# Patient Record
Sex: MALE | Race: OTHER | HISPANIC OR LATINO | URBAN - METROPOLITAN AREA
[De-identification: names, ages, dates, MRNs, and addresses within clinical notes are randomized per-mention and may not be internally consistent; named-entity substitution may affect disease eponyms.]

---

## 2023-01-18 ENCOUNTER — INPATIENT (INPATIENT)
Facility: HOSPITAL | Age: 38
LOS: 1 days | Discharge: HOME | DRG: 552 | End: 2023-01-20
Attending: NEUROLOGICAL SURGERY | Admitting: NEUROLOGICAL SURGERY
Payer: MEDICAID

## 2023-01-18 VITALS
DIASTOLIC BLOOD PRESSURE: 80 MMHG | TEMPERATURE: 98 F | OXYGEN SATURATION: 96 % | HEIGHT: 74 IN | RESPIRATION RATE: 18 BRPM | HEART RATE: 80 BPM | SYSTOLIC BLOOD PRESSURE: 137 MMHG | WEIGHT: 179.9 LBS

## 2023-01-18 PROCEDURE — 99285 EMERGENCY DEPT VISIT HI MDM: CPT

## 2023-01-18 RX ORDER — KETOROLAC TROMETHAMINE 30 MG/ML
30 SYRINGE (ML) INJECTION ONCE
Refills: 0 | Status: DISCONTINUED | OUTPATIENT
Start: 2023-01-18 | End: 2023-01-18

## 2023-01-18 RX ORDER — OXYCODONE AND ACETAMINOPHEN 5; 325 MG/1; MG/1
1 TABLET ORAL ONCE
Refills: 0 | Status: DISCONTINUED | OUTPATIENT
Start: 2023-01-18 | End: 2023-01-18

## 2023-01-18 RX ORDER — DIAZEPAM 5 MG
2 TABLET ORAL ONCE
Refills: 0 | Status: DISCONTINUED | OUTPATIENT
Start: 2023-01-18 | End: 2023-01-18

## 2023-01-18 RX ADMIN — Medication 2 MILLIGRAM(S): at 23:13

## 2023-01-18 RX ADMIN — OXYCODONE AND ACETAMINOPHEN 1 TABLET(S): 5; 325 TABLET ORAL at 23:13

## 2023-01-18 RX ADMIN — Medication 30 MILLIGRAM(S): at 23:13

## 2023-01-18 NOTE — ED PROVIDER NOTE - OBJECTIVE STATEMENT
38 yo M [facility] p/w L lower back pain radiating down L leg after lifting heavy things at work yesterday. No fevers/chills, saddle anes, urinary or bowel incontinence, focal numbness/weakness, No h/o IVDU/Cancer, No Trauma/Hx Spinal Surgeries.

## 2023-01-18 NOTE — ED PROVIDER NOTE - PROGRESS NOTE DETAILS
ATTG: : patient endorsed to me by Dr. Uribe. Awaiting Lumbar MRI without contrast. began with pain 2 days ago on lower back / buttocks pain that extends beyond the knee. after pain controlled found to have weakness on left leg.CT with indentation of thecal sac without obvious cord compression, Dr. Cruz consulted and rec imaging and will eval patient. Patient with pain controlled. discussed with ortho, will admit for monitor, possible surgery if symptoms not improved

## 2023-01-18 NOTE — ED PROVIDER NOTE - PHYSICAL EXAMINATION
Vital Signs Reviewed  GEN: Comfortable, NAD, AAOx3  HEENT: NCAT, MMM, Neck Supple  RESP: CTAB, No rales/rhonchi/wheezing  CV: RRR, S1S2, No murmurs  ABD: No TTP, Soft, ND, No masses, No CVA Tenderness  Extrem/Skin: Equal pulses bilat, No cyanosis/edema/rashes  Neuro: Equal strength and sensation in bilateral lower extremities

## 2023-01-18 NOTE — ED PROVIDER NOTE - CLINICAL SUMMARY MEDICAL DECISION MAKING FREE TEXT BOX
Pt p/w s/s of herniated disc among other dx with a reassuring exam. Giving meds and then will reassess gait. Pt stable.

## 2023-01-19 DIAGNOSIS — M51.26 OTHER INTERVERTEBRAL DISC DISPLACEMENT, LUMBAR REGION: ICD-10-CM

## 2023-01-19 DIAGNOSIS — M51.27 OTHER INTERVERTEBRAL DISC DISPLACEMENT, LUMBOSACRAL REGION: ICD-10-CM

## 2023-01-19 DIAGNOSIS — G95.20 UNSPECIFIED CORD COMPRESSION: ICD-10-CM

## 2023-01-19 LAB
ALBUMIN SERPL ELPH-MCNC: 3.6 G/DL — SIGNIFICANT CHANGE UP (ref 3.5–5)
ALP SERPL-CCNC: 62 U/L — SIGNIFICANT CHANGE UP (ref 40–120)
ALT FLD-CCNC: 38 U/L DA — SIGNIFICANT CHANGE UP (ref 10–60)
ANION GAP SERPL CALC-SCNC: 7 MMOL/L — SIGNIFICANT CHANGE UP (ref 5–17)
APTT BLD: 32.4 SEC — SIGNIFICANT CHANGE UP (ref 27.5–35.5)
AST SERPL-CCNC: 15 U/L — SIGNIFICANT CHANGE UP (ref 10–40)
BASOPHILS # BLD AUTO: 0.03 K/UL — SIGNIFICANT CHANGE UP (ref 0–0.2)
BASOPHILS NFR BLD AUTO: 0.4 % — SIGNIFICANT CHANGE UP (ref 0–2)
BILIRUB SERPL-MCNC: 1.1 MG/DL — SIGNIFICANT CHANGE UP (ref 0.2–1.2)
BUN SERPL-MCNC: 15 MG/DL — SIGNIFICANT CHANGE UP (ref 7–18)
CALCIUM SERPL-MCNC: 9.7 MG/DL — SIGNIFICANT CHANGE UP (ref 8.4–10.5)
CHLORIDE SERPL-SCNC: 107 MMOL/L — SIGNIFICANT CHANGE UP (ref 96–108)
CO2 SERPL-SCNC: 27 MMOL/L — SIGNIFICANT CHANGE UP (ref 22–31)
CREAT SERPL-MCNC: 1.03 MG/DL — SIGNIFICANT CHANGE UP (ref 0.5–1.3)
EGFR: 97 ML/MIN/1.73M2 — SIGNIFICANT CHANGE UP
EOSINOPHIL # BLD AUTO: 0.14 K/UL — SIGNIFICANT CHANGE UP (ref 0–0.5)
EOSINOPHIL NFR BLD AUTO: 1.7 % — SIGNIFICANT CHANGE UP (ref 0–6)
GLUCOSE SERPL-MCNC: 122 MG/DL — HIGH (ref 70–99)
HCT VFR BLD CALC: 44.1 % — SIGNIFICANT CHANGE UP (ref 39–50)
HGB BLD-MCNC: 15.8 G/DL — SIGNIFICANT CHANGE UP (ref 13–17)
IMM GRANULOCYTES NFR BLD AUTO: 0.1 % — SIGNIFICANT CHANGE UP (ref 0–0.9)
INR BLD: 1.07 RATIO — SIGNIFICANT CHANGE UP (ref 0.88–1.16)
LYMPHOCYTES # BLD AUTO: 2.98 K/UL — SIGNIFICANT CHANGE UP (ref 1–3.3)
LYMPHOCYTES # BLD AUTO: 36.1 % — SIGNIFICANT CHANGE UP (ref 13–44)
MCHC RBC-ENTMCNC: 30.2 PG — SIGNIFICANT CHANGE UP (ref 27–34)
MCHC RBC-ENTMCNC: 35.8 GM/DL — SIGNIFICANT CHANGE UP (ref 32–36)
MCV RBC AUTO: 84.2 FL — SIGNIFICANT CHANGE UP (ref 80–100)
MONOCYTES # BLD AUTO: 0.56 K/UL — SIGNIFICANT CHANGE UP (ref 0–0.9)
MONOCYTES NFR BLD AUTO: 6.8 % — SIGNIFICANT CHANGE UP (ref 2–14)
NEUTROPHILS # BLD AUTO: 4.53 K/UL — SIGNIFICANT CHANGE UP (ref 1.8–7.4)
NEUTROPHILS NFR BLD AUTO: 54.9 % — SIGNIFICANT CHANGE UP (ref 43–77)
NRBC # BLD: 0 /100 WBCS — SIGNIFICANT CHANGE UP (ref 0–0)
PLATELET # BLD AUTO: 213 K/UL — SIGNIFICANT CHANGE UP (ref 150–400)
POTASSIUM SERPL-MCNC: 4 MMOL/L — SIGNIFICANT CHANGE UP (ref 3.5–5.3)
POTASSIUM SERPL-SCNC: 4 MMOL/L — SIGNIFICANT CHANGE UP (ref 3.5–5.3)
PROT SERPL-MCNC: 7.6 G/DL — SIGNIFICANT CHANGE UP (ref 6–8.3)
PROTHROM AB SERPL-ACNC: 12.8 SEC — SIGNIFICANT CHANGE UP (ref 10.5–13.4)
RBC # BLD: 5.24 M/UL — SIGNIFICANT CHANGE UP (ref 4.2–5.8)
RBC # FLD: 12.1 % — SIGNIFICANT CHANGE UP (ref 10.3–14.5)
SARS-COV-2 RNA SPEC QL NAA+PROBE: SIGNIFICANT CHANGE UP
SODIUM SERPL-SCNC: 141 MMOL/L — SIGNIFICANT CHANGE UP (ref 135–145)
WBC # BLD: 8.25 K/UL — SIGNIFICANT CHANGE UP (ref 3.8–10.5)
WBC # FLD AUTO: 8.25 K/UL — SIGNIFICANT CHANGE UP (ref 3.8–10.5)

## 2023-01-19 PROCEDURE — 72100 X-RAY EXAM L-S SPINE 2/3 VWS: CPT | Mod: 26

## 2023-01-19 PROCEDURE — 72148 MRI LUMBAR SPINE W/O DYE: CPT | Mod: 26,MA

## 2023-01-19 PROCEDURE — 72131 CT LUMBAR SPINE W/O DYE: CPT | Mod: 26,MA

## 2023-01-19 RX ORDER — HEPARIN SODIUM 5000 [USP'U]/ML
5000 INJECTION INTRAVENOUS; SUBCUTANEOUS EVERY 8 HOURS
Refills: 0 | Status: DISCONTINUED | OUTPATIENT
Start: 2023-01-19 | End: 2023-01-20

## 2023-01-19 RX ORDER — SODIUM CHLORIDE 9 MG/ML
1000 INJECTION INTRAMUSCULAR; INTRAVENOUS; SUBCUTANEOUS ONCE
Refills: 0 | Status: COMPLETED | OUTPATIENT
Start: 2023-01-19 | End: 2023-01-19

## 2023-01-19 RX ORDER — MIDAZOLAM HYDROCHLORIDE 1 MG/ML
2 INJECTION, SOLUTION INTRAMUSCULAR; INTRAVENOUS ONCE
Refills: 0 | Status: DISCONTINUED | OUTPATIENT
Start: 2023-01-19 | End: 2023-01-19

## 2023-01-19 RX ORDER — ACETAMINOPHEN 500 MG
650 TABLET ORAL EVERY 6 HOURS
Refills: 0 | Status: DISCONTINUED | OUTPATIENT
Start: 2023-01-19 | End: 2023-01-20

## 2023-01-19 RX ORDER — SENNA PLUS 8.6 MG/1
2 TABLET ORAL AT BEDTIME
Refills: 0 | Status: DISCONTINUED | OUTPATIENT
Start: 2023-01-19 | End: 2023-01-20

## 2023-01-19 RX ORDER — DEXAMETHASONE 0.5 MG/5ML
6 ELIXIR ORAL ONCE
Refills: 0 | Status: COMPLETED | OUTPATIENT
Start: 2023-01-19 | End: 2023-01-19

## 2023-01-19 RX ORDER — METHOCARBAMOL 500 MG/1
500 TABLET, FILM COATED ORAL
Refills: 0 | Status: DISCONTINUED | OUTPATIENT
Start: 2023-01-19 | End: 2023-01-20

## 2023-01-19 RX ORDER — DEXAMETHASONE 0.5 MG/5ML
6 ELIXIR ORAL EVERY 6 HOURS
Refills: 0 | Status: DISCONTINUED | OUTPATIENT
Start: 2023-01-19 | End: 2023-01-20

## 2023-01-19 RX ORDER — OXYCODONE HYDROCHLORIDE 5 MG/1
5 TABLET ORAL EVERY 4 HOURS
Refills: 0 | Status: DISCONTINUED | OUTPATIENT
Start: 2023-01-19 | End: 2023-01-20

## 2023-01-19 RX ORDER — POLYETHYLENE GLYCOL 3350 17 G/17G
17 POWDER, FOR SOLUTION ORAL DAILY
Refills: 0 | Status: DISCONTINUED | OUTPATIENT
Start: 2023-01-19 | End: 2023-01-20

## 2023-01-19 RX ORDER — CHLORHEXIDINE GLUCONATE 213 G/1000ML
1 SOLUTION TOPICAL
Refills: 0 | Status: DISCONTINUED | OUTPATIENT
Start: 2023-01-19 | End: 2023-01-20

## 2023-01-19 RX ORDER — DEXAMETHASONE 0.5 MG/5ML
6 ELIXIR ORAL EVERY 6 HOURS
Refills: 0 | Status: DISCONTINUED | OUTPATIENT
Start: 2023-01-19 | End: 2023-01-19

## 2023-01-19 RX ORDER — OXYCODONE HYDROCHLORIDE 5 MG/1
10 TABLET ORAL EVERY 4 HOURS
Refills: 0 | Status: DISCONTINUED | OUTPATIENT
Start: 2023-01-19 | End: 2023-01-20

## 2023-01-19 RX ORDER — KETOROLAC TROMETHAMINE 30 MG/ML
15 SYRINGE (ML) INJECTION ONCE
Refills: 0 | Status: DISCONTINUED | OUTPATIENT
Start: 2023-01-19 | End: 2023-01-19

## 2023-01-19 RX ORDER — PANTOPRAZOLE SODIUM 20 MG/1
40 TABLET, DELAYED RELEASE ORAL
Refills: 0 | Status: DISCONTINUED | OUTPATIENT
Start: 2023-01-19 | End: 2023-01-20

## 2023-01-19 RX ORDER — MUPIROCIN 20 MG/G
1 OINTMENT TOPICAL
Refills: 0 | Status: DISCONTINUED | OUTPATIENT
Start: 2023-01-19 | End: 2023-01-20

## 2023-01-19 RX ADMIN — OXYCODONE AND ACETAMINOPHEN 1 TABLET(S): 5; 325 TABLET ORAL at 02:33

## 2023-01-19 RX ADMIN — Medication 30 MILLIGRAM(S): at 02:33

## 2023-01-19 RX ADMIN — MIDAZOLAM HYDROCHLORIDE 2 MILLIGRAM(S): 1 INJECTION, SOLUTION INTRAMUSCULAR; INTRAVENOUS at 02:23

## 2023-01-19 RX ADMIN — METHOCARBAMOL 500 MILLIGRAM(S): 500 TABLET, FILM COATED ORAL at 17:37

## 2023-01-19 RX ADMIN — Medication 15 MILLIGRAM(S): at 07:01

## 2023-01-19 RX ADMIN — HEPARIN SODIUM 5000 UNIT(S): 5000 INJECTION INTRAVENOUS; SUBCUTANEOUS at 22:28

## 2023-01-19 RX ADMIN — SENNA PLUS 2 TABLET(S): 8.6 TABLET ORAL at 22:27

## 2023-01-19 RX ADMIN — OXYCODONE HYDROCHLORIDE 5 MILLIGRAM(S): 5 TABLET ORAL at 11:00

## 2023-01-19 RX ADMIN — PANTOPRAZOLE SODIUM 40 MILLIGRAM(S): 20 TABLET, DELAYED RELEASE ORAL at 10:25

## 2023-01-19 RX ADMIN — Medication 6 MILLIGRAM(S): at 06:24

## 2023-01-19 RX ADMIN — Medication 6 MILLIGRAM(S): at 17:37

## 2023-01-19 RX ADMIN — Medication 15 MILLIGRAM(S): at 02:23

## 2023-01-19 RX ADMIN — OXYCODONE HYDROCHLORIDE 5 MILLIGRAM(S): 5 TABLET ORAL at 11:30

## 2023-01-19 RX ADMIN — SODIUM CHLORIDE 1000 MILLILITER(S): 9 INJECTION INTRAMUSCULAR; INTRAVENOUS; SUBCUTANEOUS at 02:23

## 2023-01-19 NOTE — CONSULT NOTE ADULT - SUBJECTIVE AND OBJECTIVE BOX
Pt Name: MORENA OATES  MRN: [id number]      NEUROSURGERY CONSULT:     Diagnosis:  L4-L5 disc displacement    Patient is a 37y Male   HPI: 38 y/o male complaining of low back pain with LLE weakness x 1 day s/p heavy lifting >30 lbs. 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 no previous spinal surgery or injections. no previous back pain. has numbness and tingling of the LLE. has weakness of the LLE.   Pt denies Chest pain, SOB, dyspnea, N/V/D, abdominal pain, syncope, or pain anywhere else.       HEALTH ISSUES - PROBLEM Dx:      .    Ambulation: Walking independently [ ] With Cane [ ] With Walker [ ]  Bed / wheelchairbound [ ]     PAST MEDICAL & SURGICAL HISTORY:      Allergies: No Known Allergies      Vital Signs Last 24 Hrs  T(C): 36.7 (19 Jan 2023 07:00), Max: 36.9 (18 Jan 2023 22:23)  T(F): 98 (19 Jan 2023 07:00), Max: 98.5 (18 Jan 2023 22:23)  HR: 70 (19 Jan 2023 07:00) (56 - 80)  BP: 104/62 (19 Jan 2023 07:00) (102/60 - 137/80)  BP(mean): --  RR: 18 (19 Jan 2023 07:00) (17 - 18)  SpO2: 99% (19 Jan 2023 07:00) (94% - 99%)    Parameters below as of 19 Jan 2023 06:30  Patient On (Oxygen Delivery Method): room air        Physical Exam:  Appearance: Alert, responsive, in no acute distress.  Musculoskeletal:         Left Upper Extremity: Atraumatic with normal alignment NROM. No crepitus. No bony tenderness.        Right Upper Extremity: Atraumatic with normal alignment NROM. No crepitus. No bony tenderness.        Left Lower Extremity: Atraumatic with normal alignment NROM. No crepitus. No bony tenderness. No calf tenderness, Calf soft.       Right Lower Extremity: Atraumatic with normal alignment NROM. No crepitus. No bony tenderness.  No calf tenderness, Calf soft.    Neurological: Sensation is grossly intact to light touch. No focal deficits or weaknesses found.    Motor exam:          Spine: Skin is pink warm, nontender to the spine.            Upper extremities: 5/5 strength of the upper extremities           Lower extremities: 3/5 strength LLE. 5/5 strength RLE. able to SLR bilaterally. 45 degrees to RLE causes pain. 30 degrees to LLE causes pain. (+)SLR test bilaterally. skin intact. grossly nvi. distal pulses 2+.       Labs:                        15.8   8.25  )-----------( 213      ( 19 Jan 2023 02:10 )             44.1     01-19    141  |  107  |  15  ----------------------------<  122<H>  4.0   |  27  |  1.03    Ca    9.7      19 Jan 2023 02:10    TPro  7.6  /  Alb  3.6  /  TBili  1.1  /  DBili  x   /  AST  15  /  ALT  38  /  AlkPhos  62  01-19      Radiology: < from: CT Lumbar Spine No Cont (01.19.23 @ 02:19) >    IMPRESSION:    Transitional lumbosacral anatomy. L4-L5 left paracentral/subarticular   disc herniation/extrusion indenting the ventral thecal sac. More   sensitive evaluation with noncontrast MRI may be obtained, as clinically   warranted, if no contraindications exist.    --- End of Report ---    < end of copied text >        Impression:  Pt is a  37y Male with L4-L5 disc herniation. needs MRI. xray    Plan:  - Recommendation: MR L4-L5 without contrast. XRAY lumbar spine flex/ex views.     - if MRI shows further impingement, requires SURGERY.     - if not, conservative treatment     - ordered Decadron 6mg q6h, muscle relaxers, pain, medicine.   - Pain management  - DVT ppx with venodynes  - PT- WBAT of the lower extremities. Proper body mechanics.  - Case d/w Dr. Cruz

## 2023-01-19 NOTE — ED ADULT NURSE NOTE - OBJECTIVE STATEMENT
38 yo M [facility] p/w L lower back pain radiating down L leg after lifting heavy things at work yesterday. No fevers/chills, saddle anes, urinary or bowel incontinence, focal numbness/weakness, No h/o IVDU/Cancer, No Trauma/Hx Spinal Surgeries.

## 2023-01-19 NOTE — H&P ADULT - NSHPPHYSICALEXAM_GEN_ALL_CORE
T(C): 36.6 (01-19-23 @ 15:26), Max: 37.2 (01-19-23 @ 11:00)  HR: 80 (01-19-23 @ 15:26) (56 - 84)  BP: 114/72 (01-19-23 @ 15:26) (100/59 - 137/80)  RR: 18 (01-19-23 @ 15:26) (17 - 18)  SpO2: 97% (01-19-23 @ 15:26) (94% - 99%)    CONSTITUTIONAL: Well groomed, no apparent distress  EYES: PERRLA and symmetric, EOMI, No conjunctival or scleral injection, non-icteric  ENMT: Oral mucosa with moist membranes. Normal dentition; no pharyngeal injection or exudates             NECK: Supple, symmetric and without tracheal deviation   RESP: No respiratory distress, no use of accessory muscles; CTA b/l, no WRR  CV: RRR, +S1S2, no MRG; no JVD; no peripheral edema  GI: Soft, NT, ND, no rebound, no guarding; no palpable masses; no hepatosplenomegaly; no hernia palpated  LYMPH: No cervical LAD or tenderness; no axillary LAD or tenderness; no inguinal LAD or tenderness  MSK: antalgic gait due to pain; No digital clubbing or cyanosis; examination of the (head/neck/spine/ribs/pelvis, RUE, LUE, RLE, LLE) without misalignment,            decreased ROM  of the hips and knees pain, no spinal tenderness, normal muscle strength/tone (+) SLR test bilaterally. worse on Left than Right.   SKIN: No rashes or ulcers noted; no subcutaneous nodules or induration palpable  NEURO: CN II-XII intact; normal reflexes in upper and lower extremities, sensation intact in upper and lower extremities b/l to light touch   PSYCH: Appropriate insight/judgment; A+O x 3, mood and affect appropriate, recent/remote memory intact

## 2023-01-19 NOTE — ED ADULT NURSE NOTE - PRIMARY CARE PROVIDER
unkn -hx of GIB, reported brown colored emesis and dark color stools on admission  -will f/u FOBT  -trend H-H  - f/u GI rec  - PPI BID, Carafate -hx of GIB, reported brown colored emesis and dark color stools on admission  -FOBT positive  -plan for EGD tomorrow, NPO after midnight  -trend H-H  -protonix 40 mg IV BID, Carafate QID -hx of GIB, reported brown colored emesis and dark color stools on admission  -FOBT positive  -plan for EGD tomorrow, NPO after midnight pt with H/o PUD & GI  bLeed  -trend H-H  -protonix 40 mg IV BID, Carafate QID  D/W DR Morales.

## 2023-01-19 NOTE — H&P ADULT - NSHPLABSRESULTS_GEN_ALL_CORE
< from: MR Lumbar Spine No Cont (01.19.23 @ 15:08) >      ACC: [id number] EXAM:  MR SPINE LUMBAR   ORDERED BY: AYAZ ANDREWS     PROCEDURE DATE:  01/19/2023          INTERPRETATION:  MR lumbar without gadolinium    CLINICAL INFORMATION: Low back pain with LEFT leg weakness    TECHNIQUE:   Sagittal and axial T1-weighted images, sagittal STIR images,   sagittal T2-weighted images and axial T1 and T2-weighted images of the   lumbar spine were obtained.    FINDINGS:   No prior similar studies are available for review.    Transitional anatomy with a hypoplastic S1-2 intervertebral disc. Note   that this labeling differs from the CT scan.    Lumbar vertebral alignment is preserved.  Lumbar vertebral body heights   are maintained.  Marrow signal intensity within lumbar vertebral bodies   and posterior elements is significant for marrow edema at the L5-S1   level.  No osseous expansion, epidural disease or paraspinal abnormality   is found.    Lumbar intervertebral discs show moderate degenerative disc disease at   L4-5 and L5-S1 with loss of disc height and signals nucleus pulposus with   mild disc bulges. Small RIGHT paracentral disc herniation at L4-5 abuts   the ventral thecal sac. Moderate 1.3 x 1.4 x 1.8 cm disc herniation at   L5-S1 compresses the ventral thecal sac and the descending LEFTS1 nerve   root with compression of the exiting LEFT L5 nerve root.    The distal cord maintains intact morphology.  Distal cord signal   intensity is preserved.  The conus is normally positioned at the L2   level.  Nerve roots of the cauda equina appear intact.      IMPRESSION:  Transitional anatomy with a hypoplastic S1-2 intervertebral   disc. * Note that this labeling differs from the CT scan.* Moderate   degenerative disc disease at L4-5 and L5-S1 with loss of disc height and   signals nucleus pulposus with mild disc bulges. Small RIGHT paracentral   disc herniation at L4-5 abuts the ventral thecal sac. Moderate 1.3 x 1.4   x 1.8 cm disc herniation at L5-S1 compresses the ventral thecal sac and   the descending LEFT S1 nerve root with compression of the exiting LEFT L5   nerve root.      --- End of Report ---            DINA VAZQUEZ MD; Attending Radiologist  This document has been electronically signed. Jan 19 2023  4:09PM    < end of copied text >

## 2023-01-19 NOTE — H&P ADULT - HISTORY OF PRESENT ILLNESS
36 y/o with no signficant past medical history is here today complaining of LLE weakness and low back pain since yesterday after lifting heavy things. denies any previous injuries, any previous surgeries to the spine, or injections. Pt denies Chest pain, SOB, dyspnea,  N/V/D, abdominal pain, syncope, or pain anywhere else.

## 2023-01-19 NOTE — H&P ADULT - ASSESSMENT
- Admit to Neurosurgery Dr. Cruz  - Condition: Stable  - Transfer to Floor  - Pre-op for Surgery on Monday, 1/23/23  - Procedure: L5-S1 microdiscectomy  - Surgeon: Nancy  - Clearance: Pending  - Consent: Pending  - Medical Clearance called  - Keep NPO past midnight 1/22/23  - IVF when NPO  - Hold Anticoagulants on 1/22/23  WBAT BLLE daily PT  - Pain Management  - DVT ppx with Venodynes and Heparin  - Case Discussed with DR Cruz

## 2023-01-19 NOTE — H&P ADULT - NSHPADDITIONALINFOADULT_GEN_ALL_CORE
I have seen and examined the patient. I agree with the history and physical documented. I will admit him and give him  a trial of  steroids. If no benefit then we will discuss surgery.

## 2023-01-19 NOTE — ED ADULT NURSE REASSESSMENT NOTE - NS ED NURSE REASSESS COMMENT FT1
Pt received upon changing shift, resting in bed, no acute respiratory distress noted. awaiting for MRI.

## 2023-01-20 ENCOUNTER — TRANSCRIPTION ENCOUNTER (OUTPATIENT)
Age: 38
End: 2023-01-20

## 2023-01-20 VITALS — OXYGEN SATURATION: 95 % | HEART RATE: 83 BPM | SYSTOLIC BLOOD PRESSURE: 123 MMHG | DIASTOLIC BLOOD PRESSURE: 75 MMHG

## 2023-01-20 LAB
ANION GAP SERPL CALC-SCNC: 8 MMOL/L — SIGNIFICANT CHANGE UP (ref 5–17)
BASOPHILS # BLD AUTO: 0.01 K/UL — SIGNIFICANT CHANGE UP (ref 0–0.2)
BASOPHILS NFR BLD AUTO: 0.1 % — SIGNIFICANT CHANGE UP (ref 0–2)
BUN SERPL-MCNC: 17 MG/DL — SIGNIFICANT CHANGE UP (ref 7–18)
CALCIUM SERPL-MCNC: 9.8 MG/DL — SIGNIFICANT CHANGE UP (ref 8.4–10.5)
CHLORIDE SERPL-SCNC: 107 MMOL/L — SIGNIFICANT CHANGE UP (ref 96–108)
CO2 SERPL-SCNC: 26 MMOL/L — SIGNIFICANT CHANGE UP (ref 22–31)
CREAT SERPL-MCNC: 0.93 MG/DL — SIGNIFICANT CHANGE UP (ref 0.5–1.3)
EGFR: 110 ML/MIN/1.73M2 — SIGNIFICANT CHANGE UP
EOSINOPHIL # BLD AUTO: 0 K/UL — SIGNIFICANT CHANGE UP (ref 0–0.5)
EOSINOPHIL NFR BLD AUTO: 0 % — SIGNIFICANT CHANGE UP (ref 0–6)
GLUCOSE SERPL-MCNC: 145 MG/DL — HIGH (ref 70–99)
HCT VFR BLD CALC: 46.6 % — SIGNIFICANT CHANGE UP (ref 39–50)
HGB BLD-MCNC: 16.2 G/DL — SIGNIFICANT CHANGE UP (ref 13–17)
IMM GRANULOCYTES NFR BLD AUTO: 0.3 % — SIGNIFICANT CHANGE UP (ref 0–0.9)
LYMPHOCYTES # BLD AUTO: 1.3 K/UL — SIGNIFICANT CHANGE UP (ref 1–3.3)
LYMPHOCYTES # BLD AUTO: 9.8 % — LOW (ref 13–44)
MCHC RBC-ENTMCNC: 29 PG — SIGNIFICANT CHANGE UP (ref 27–34)
MCHC RBC-ENTMCNC: 34.8 GM/DL — SIGNIFICANT CHANGE UP (ref 32–36)
MCV RBC AUTO: 83.5 FL — SIGNIFICANT CHANGE UP (ref 80–100)
MONOCYTES # BLD AUTO: 0.34 K/UL — SIGNIFICANT CHANGE UP (ref 0–0.9)
MONOCYTES NFR BLD AUTO: 2.6 % — SIGNIFICANT CHANGE UP (ref 2–14)
NEUTROPHILS # BLD AUTO: 11.56 K/UL — HIGH (ref 1.8–7.4)
NEUTROPHILS NFR BLD AUTO: 87.2 % — HIGH (ref 43–77)
NRBC # BLD: 0 /100 WBCS — SIGNIFICANT CHANGE UP (ref 0–0)
PLATELET # BLD AUTO: 247 K/UL — SIGNIFICANT CHANGE UP (ref 150–400)
POTASSIUM SERPL-MCNC: 4 MMOL/L — SIGNIFICANT CHANGE UP (ref 3.5–5.3)
POTASSIUM SERPL-SCNC: 4 MMOL/L — SIGNIFICANT CHANGE UP (ref 3.5–5.3)
RBC # BLD: 5.58 M/UL — SIGNIFICANT CHANGE UP (ref 4.2–5.8)
RBC # FLD: 12.1 % — SIGNIFICANT CHANGE UP (ref 10.3–14.5)
SODIUM SERPL-SCNC: 141 MMOL/L — SIGNIFICANT CHANGE UP (ref 135–145)
WBC # BLD: 13.25 K/UL — HIGH (ref 3.8–10.5)
WBC # FLD AUTO: 13.25 K/UL — HIGH (ref 3.8–10.5)

## 2023-01-20 PROCEDURE — 99232 SBSQ HOSP IP/OBS MODERATE 35: CPT

## 2023-01-20 PROCEDURE — 85610 PROTHROMBIN TIME: CPT

## 2023-01-20 PROCEDURE — 85730 THROMBOPLASTIN TIME PARTIAL: CPT

## 2023-01-20 PROCEDURE — 96372 THER/PROPH/DIAG INJ SC/IM: CPT | Mod: XU

## 2023-01-20 PROCEDURE — 96375 TX/PRO/DX INJ NEW DRUG ADDON: CPT

## 2023-01-20 PROCEDURE — 36415 COLL VENOUS BLD VENIPUNCTURE: CPT

## 2023-01-20 PROCEDURE — 72131 CT LUMBAR SPINE W/O DYE: CPT | Mod: MA

## 2023-01-20 PROCEDURE — 80053 COMPREHEN METABOLIC PANEL: CPT

## 2023-01-20 PROCEDURE — 99285 EMERGENCY DEPT VISIT HI MDM: CPT | Mod: 25

## 2023-01-20 PROCEDURE — 72100 X-RAY EXAM L-S SPINE 2/3 VWS: CPT

## 2023-01-20 PROCEDURE — 87635 SARS-COV-2 COVID-19 AMP PRB: CPT

## 2023-01-20 PROCEDURE — 72148 MRI LUMBAR SPINE W/O DYE: CPT | Mod: MA

## 2023-01-20 PROCEDURE — 80048 BASIC METABOLIC PNL TOTAL CA: CPT

## 2023-01-20 PROCEDURE — 96374 THER/PROPH/DIAG INJ IV PUSH: CPT

## 2023-01-20 PROCEDURE — 97162 PT EVAL MOD COMPLEX 30 MIN: CPT

## 2023-01-20 PROCEDURE — 85025 COMPLETE CBC W/AUTO DIFF WBC: CPT

## 2023-01-20 RX ORDER — IBUPROFEN 200 MG
1 TABLET ORAL
Qty: 21 | Refills: 0
Start: 2023-01-20 | End: 2023-01-26

## 2023-01-20 RX ORDER — SENNA PLUS 8.6 MG/1
2 TABLET ORAL
Qty: 10 | Refills: 0
Start: 2023-01-20 | End: 2023-01-24

## 2023-01-20 RX ADMIN — CHLORHEXIDINE GLUCONATE 1 APPLICATION(S): 213 SOLUTION TOPICAL at 05:30

## 2023-01-20 RX ADMIN — OXYCODONE HYDROCHLORIDE 10 MILLIGRAM(S): 5 TABLET ORAL at 01:31

## 2023-01-20 RX ADMIN — OXYCODONE HYDROCHLORIDE 10 MILLIGRAM(S): 5 TABLET ORAL at 12:42

## 2023-01-20 RX ADMIN — Medication 6 MILLIGRAM(S): at 00:09

## 2023-01-20 RX ADMIN — OXYCODONE HYDROCHLORIDE 10 MILLIGRAM(S): 5 TABLET ORAL at 02:15

## 2023-01-20 RX ADMIN — METHOCARBAMOL 500 MILLIGRAM(S): 500 TABLET, FILM COATED ORAL at 05:26

## 2023-01-20 RX ADMIN — PANTOPRAZOLE SODIUM 40 MILLIGRAM(S): 20 TABLET, DELAYED RELEASE ORAL at 05:29

## 2023-01-20 RX ADMIN — Medication 6 MILLIGRAM(S): at 05:25

## 2023-01-20 RX ADMIN — OXYCODONE HYDROCHLORIDE 10 MILLIGRAM(S): 5 TABLET ORAL at 13:13

## 2023-01-20 RX ADMIN — Medication 6 MILLIGRAM(S): at 13:08

## 2023-01-20 NOTE — PHYSICAL THERAPY INITIAL EVALUATION ADULT - DIAGNOSIS, PT EVAL
Pt demonstrates decreased strength in left hip flexors, knee extensors, L ankle DF and L ankle eversion. LT intact except over dorsal part of left foot. c/o pain in back at 2-3/10 in lying postion, increases in sittingPt has decreased functional status due to these impairments

## 2023-01-20 NOTE — PROGRESS NOTE ADULT - TIME BILLING
Explaining the MRI findings and the correlation to the symptoms as well as prognosis and modalities of treatment.

## 2023-01-20 NOTE — DISCHARGE NOTE NURSING/CASE MANAGEMENT/SOCIAL WORK - PATIENT PORTAL LINK FT
You can access the FollowMyHealth Patient Portal offered by [facility] by registering at the following website: [email or web address]. By joining [facility]’s FollowMyHealth portal, you will also be able to view your health information using other applications (apps) compatible with our system.

## 2023-01-20 NOTE — PHYSICAL THERAPY INITIAL EVALUATION ADULT - GENERAL OBSERVATIONS, REHAB EVAL
Pt found in NAD, supine in bed with wife at bedside. Pt is A&Ox4 pleasant and coop. Pt is unilingual Kyrgyz hence  Kerrie [id number] used for serviced

## 2023-01-20 NOTE — PROGRESS NOTE ADULT - ASSESSMENT
His symptoms are improving. He is able to ambulate and the pain is better. We agreed to a trial of conservative therapy and wait on surgery. Since he has no bowel or bladder incontinence He will go home with a medrol-dose pack and a PT prescription. I will see him in the office in 2 to 4 weeks if stable. If the pain gets worse or the weakness He will call the office sooner. [phone]

## 2023-01-20 NOTE — PHYSICAL THERAPY INITIAL EVALUATION ADULT - NSACTIVITYREC_GEN_A_PT
Pt to avoid pulling pushing lifting and sitting for long periods of time. Pt educ re: proper positioning and David exercises

## 2023-01-20 NOTE — PHYSICAL THERAPY INITIAL EVALUATION ADULT - ACTIVE RANGE OF MOTION EXAMINATION, REHAB EVAL
left ankle DF missing 1/4 range and eversions to 1/2 range/no Active ROM deficits were identified/deficits as listed below

## 2023-01-20 NOTE — PROGRESS NOTE ADULT - SUBJECTIVE AND OBJECTIVE BOX
Pt Name: MORENA OATES  MRN: [id number]      NEUROSURGERY CONSULT:     Diagnosis:  L4-L5 disc displacement    Patient is a 37y Male   HPI:  Patient is Niuean speaking,  ID# [id number]  Patient seen and evaluated at bedside. Patient noted improvements in his symptoms today from yesterday  Patient reports pain in lower back to bilateral hips. Patient states that yesterday the pain radiated to the left leg and he reports having decreased strength to the left leg which has since resolved.  Pt denies Chest pain, SOB, dyspnea, paresthesias, N/V/D, abdominal pain, syncope, bowel/bladder changes, saddle anesthesia or pain anywhere else.       HEALTH ISSUES - PROBLEM Dx:      .    Ambulation: Walking independently [ ] With Cane [ ] With Walker [ ]  Bed / wheelchairbound [ ]     PAST MEDICAL & SURGICAL HISTORY:      Allergies: No Known Allergies      Vital Signs Last 24 Hrs  T(C): 36.7 (19 Jan 2023 07:00), Max: 36.9 (18 Jan 2023 22:23)  T(F): 98 (19 Jan 2023 07:00), Max: 98.5 (18 Jan 2023 22:23)  HR: 70 (19 Jan 2023 07:00) (56 - 80)  BP: 104/62 (19 Jan 2023 07:00) (102/60 - 137/80)  BP(mean): --  RR: 18 (19 Jan 2023 07:00) (17 - 18)  SpO2: 99% (19 Jan 2023 07:00) (94% - 99%)    Parameters below as of 19 Jan 2023 06:30  Patient On (Oxygen Delivery Method): room air        Physical Exam:  Appearance: Alert, responsive, in no acute distress.  Musculoskeletal:         Left Upper Extremity: Atraumatic with normal alignment NROM. No crepitus. No bony tenderness.        Right Upper Extremity: Atraumatic with normal alignment NROM. No crepitus. No bony tenderness.        Left Lower Extremity: Atraumatic with normal alignment NROM. No crepitus. No bony tenderness. No calf tenderness, Calf soft.       Right Lower Extremity: Atraumatic with normal alignment NROM. No crepitus. No bony tenderness.  No calf tenderness, Calf soft.    Neurological: Sensation is grossly intact to light touch. No focal deficits or weaknesses found.    Motor exam:          Spine: Skin is pink warm, nontender to the spine.            Upper extremities: 5/5 strength of the upper extremities           Lower extremities: 5/5 strength LLE. 5/5 strength RLE. able to SLR bilaterally. 4 skin intact. grossly nvi. distal pulses 2+.       Labs:                        15.8   8.25  )-----------( 213      ( 19 Jan 2023 02:10 )             44.1     01-19    141  |  107  |  15  ----------------------------<  122<H>  4.0   |  27  |  1.03    Ca    9.7      19 Jan 2023 02:10    TPro  7.6  /  Alb  3.6  /  TBili  1.1  /  DBili  x   /  AST  15  /  ALT  38  /  AlkPhos  62  01-19      Radiology: < from: CT Lumbar Spine No Cont (01.19.23 @ 02:19) >    IMPRESSION:    Transitional lumbosacral anatomy. L4-L5 left paracentral/subarticular   disc herniation/extrusion indenting the ventral thecal sac. More   sensitive evaluation with noncontrast MRI may be obtained, as clinically   warranted, if no contraindications exist.          Impression:  Pt is a  37y Male with L4-L5 disc herniation.     Plan:  - Pain management   > Pain and symptoms appear to be improving   > No urgent/emergent surgical intervention required at this time   > Patient was seen by Dr Cruz this morning who recommends outpatient PT and medrol dose pack   - DVT ppx with venodynes  - PT- WBAT of the lower extremities. Proper body mechanics.  -  Discharge home today  - Case d/w Dr. Cruz  
He states he feels better. Was able to walk and his leg is not as weak.  AAOx3  UE 5/5 with normal sensation.  LE RT 5/5 all groups. No SLR  LE LEFT. SLR improved. IP,QC KE 5/5 EHL PF 4+/5.  Sensation improved but mile L5 decreased sensation.

## 2023-01-20 NOTE — DISCHARGE NOTE PROVIDER - HOSPITAL COURSE
Patient is a 38 y/o M who was admitted to [facility] due to low back pain with radiculopathy and lower extremity weakness. Patient had an MRI performed which revealed lumbar disc herniations. Patient received physical therapy and pain management throughout hospital stay with improvements in symptoms.

## 2023-01-20 NOTE — DISCHARGE NOTE PROVIDER - NSDCMRMEDTOKEN_GEN_ALL_CORE_FT
ibuprofen 600 mg oral tablet: 1 tab(s) orally every 8 hours, As Needed -for moderate pain MDD:3  MethylPREDNISolone Dose Pack 4 mg oral tablet: 1 packet(s) orally as instructed on the packet MDD:6     Day 1: 6 tabs  Day 2: 5 tabs  Day 3: 4 tabs  Day 4: 3 tabs  Day 5: 2 tabs  Day 6: 1 tab  PT: Physical therapy 2-3/week x 6-8 weeks for lower back    ICD 10    M54.16: Radiculopathy, lumbar region     M51. 26 : intervertebral disc displacement, lumbar region  senna leaf extract oral tablet: 2 tab(s) orally once a day (at bedtime), As Needed -for constipation MDD:2

## 2023-01-20 NOTE — DISCHARGE NOTE PROVIDER - NSDCCPCAREPLAN_GEN_ALL_CORE_FT
PRINCIPAL DISCHARGE DIAGNOSIS  Diagnosis: Herniation of left side of L4-L5 intervertebral disc  Assessment and Plan of Treatment: Weight Bearing Status: WBAT to lower extremities with proper body mechanics   > No heavy lifting, avoid bending/twisting  Continue with Medrol dose pack as prescribed  Start outpatient physical therapy  Follow up with Dr. Cruz in office in 2-4 weeks      SECONDARY DISCHARGE DIAGNOSES  Diagnosis: Herniation of intervertebral disc between L5 and S1  Assessment and Plan of Treatment:

## 2023-01-20 NOTE — DISCHARGE NOTE PROVIDER - CARE PROVIDER_API CALL
Uriel Cruz (MD; LESLI; MS)  Neurosurgery  [address], [address]  [place] [address]  Phone: [phone]  Fax: [phone]  Follow Up Time: 2 weeks

## 2023-01-23 ENCOUNTER — NON-APPOINTMENT (OUTPATIENT)
Age: 38
End: 2023-01-23

## 2023-01-23 PROBLEM — Z00.00 ENCOUNTER FOR PREVENTIVE HEALTH EXAMINATION: Status: ACTIVE | Noted: 2023-01-23

## 2023-01-23 PROBLEM — Z78.9 OTHER SPECIFIED HEALTH STATUS: Chronic | Status: ACTIVE | Noted: 2023-01-19

## 2023-02-04 ENCOUNTER — EMERGENCY (EMERGENCY)
Facility: HOSPITAL | Age: 38
LOS: 1 days | Discharge: HOME | End: 2023-02-04
Attending: STUDENT IN AN ORGANIZED HEALTH CARE EDUCATION/TRAINING PROGRAM
Payer: SELF-PAY

## 2023-02-04 VITALS
HEART RATE: 97 BPM | TEMPERATURE: 98 F | HEIGHT: 72 IN | OXYGEN SATURATION: 98 % | RESPIRATION RATE: 18 BRPM | DIASTOLIC BLOOD PRESSURE: 82 MMHG | SYSTOLIC BLOOD PRESSURE: 127 MMHG | WEIGHT: 199.96 LBS

## 2023-02-04 PROCEDURE — 96372 THER/PROPH/DIAG INJ SC/IM: CPT

## 2023-02-04 PROCEDURE — 99284 EMERGENCY DEPT VISIT MOD MDM: CPT

## 2023-02-04 PROCEDURE — 99283 EMERGENCY DEPT VISIT LOW MDM: CPT | Mod: 25

## 2023-02-04 RX ORDER — KETOROLAC TROMETHAMINE 30 MG/ML
30 SYRINGE (ML) INJECTION ONCE
Refills: 0 | Status: DISCONTINUED | OUTPATIENT
Start: 2023-02-04 | End: 2023-02-04

## 2023-02-04 RX ORDER — OXYCODONE AND ACETAMINOPHEN 5; 325 MG/1; MG/1
1 TABLET ORAL
Qty: 12 | Refills: 0
Start: 2023-02-04

## 2023-02-04 RX ORDER — DIAZEPAM 5 MG
5 TABLET ORAL ONCE
Refills: 0 | Status: DISCONTINUED | OUTPATIENT
Start: 2023-02-04 | End: 2023-02-04

## 2023-02-04 RX ORDER — DOCUSATE SODIUM 100 MG
1 CAPSULE ORAL
Qty: 14 | Refills: 0
Start: 2023-02-04 | End: 2023-02-10

## 2023-02-04 RX ADMIN — Medication 30 MILLIGRAM(S): at 07:17

## 2023-02-04 RX ADMIN — Medication 30 MILLIGRAM(S): at 09:43

## 2023-02-04 RX ADMIN — Medication 5 MILLIGRAM(S): at 07:17

## 2023-02-04 NOTE — ED PROVIDER NOTE - PROGRESS NOTE DETAILS
Pt has just received meds. Plan to sign out to incoming doc pending clinical reassessment after meds take effect. Pt stable. Pt endorsed awaiting reassess after meds. States pain significantly improved, 2/10. Ambulatory w/o difficulty. Pt has appt Dr. Cruz on Tuesday. Educated pt on meds use, bowel med, signs/symptoms to return sooner to ED.

## 2023-02-04 NOTE — ED PROVIDER NOTE - CLINICAL SUMMARY MEDICAL DECISION MAKING FREE TEXT BOX
Pt p/w L buttock/leg pain in radiculopathy distribution with nml strength in legs and no red flag features. Reassuring exam. Leg weakness from nerve root compression resolved 2 weeks ago with steroids. Giving pain meds. Pt stable. Will reassess.

## 2023-02-04 NOTE — ED PROVIDER NOTE - NS ED ROS FT
Patient Reports No  Fever/Chills  Nausea/Vomiting/Diarrhea  Urinary Symptoms  Chest Pain, Shortness of Breath  Syncope, Focal Numbness or Weakness  Other Recent Illness or Hospitalizations
WDL

## 2023-02-04 NOTE — ED PROVIDER NOTE - OBJECTIVE STATEMENT
36 yo M h/o recent admission for S1 nerve root compression, no surgeries p/w intermittent pain in L buttock that radiates down the entire L leg with no weakness nor other associated symptoms. Pt states that the pain is dull when sitting and then feels like an "electric shock" in different positions. Pt finished a course of methylprednisolone several days ago and has been taking ibuprofen at home. Pt states that the leg weakness he experienced 2 weeks ago resolved during the hospitalization and never returned however the buttock/leg pain became more severe yesterday. Pt denies any recent trauma, saddle anesthesia, urinary or bowel incontinence, focal numbness/weakness, fever, N/V/ diarrhea, dysuria or frequency/hesitancy, chest pain, SOB, syncope, other recent illness or hospitalizations.

## 2023-02-04 NOTE — ED PROVIDER NOTE - PHYSICAL EXAMINATION
Vital Signs Reviewed  GEN: Comfortable, NAD  HEENT: NCAT, MMM, Neck Supple  RESP: CTAB, No rales/rhonchi/wheezing  CV: RRR, S1S2, No murmurs  ABD: No TTP, ND, No masses  Extrem/Skin: Equal pulses bilat, No cyanosis/edema/rashes  Neuro: AAOx3, Equal 5/5 strength and nml sensation in lower extremities bilat, Antalgic gait

## 2023-02-04 NOTE — ED PROVIDER NOTE - NSFOLLOWUPINSTRUCTIONS_ED_ALL_ED_FT
Radiculopatía lumbosacra    Lumbosacral Radiculopathy      La radiculopatía lumbosacra es belle afección que compromete los nervios raquídeos, y las raíces nerviosas de la parte baja de la espalda y el sacro. La afección se manifiesta cuando estos nervios y las raíces nerviosas se desplazan o se inflaman y causan síntomas.      ¿Cuáles son las causas?    Esta afección puede ser causada por lo siguiente:  •Presión en un disco que sobresale de alfaro lugar (hernia de disco). Un disco es belle placa de cartílago blando que separa los huesos de la [place].      •Cambios en los discos que se producen con la edad (degeneración de los discos).      •Un estrechamiento de los huesos de la parte baja de la espalda (estenosis del conducto raquídeo).      •Un tumor.      •Belle infección.      •Belle lesión que ejerce belle presión repentina en los discos que amortiguan los huesos de la parte más baja de la columna.        ¿Qué incrementa el riesgo?    Es más probable que sufra esta afección si:  •Es hombre y tiene entre 30 y 50 años de edad.      •Es melody y tiene entre 50 y 60 años de edad.      •Usa belle técnica incorrecta al levantar objetos.      •Tiene sobrepeso o lleva un estilo de radha sedentario.      •Fuma.      •Alfaro trabajo requiere levantar objetos pesados con frecuencia.      •Hace actividades repetitivas que tensionan la [place] vertebral.        ¿Cuáles son los signos o síntomas?  Back view of a person showing a normal leg and a leg affected by the pain of sciatica.   Los síntomas de esta afección incluyen:  •Dolor que baja por la espalda hasta las piernas (ciática), generalmente en un lado del cuerpo. Ceasar es el síntoma más frecuente. El dolor puede empeorar al sentarse, toser o estornudar.      •Hormigueo y entumecimiento en las piernas.      •Debilidad muscular en las piernas.      •Pérdida del control del intestino o de la vejiga.        ¿Cómo se diagnostica?    Esta afección se puede diagnosticar en función de lo siguiente:  •Los síntomas y los antecedentes médicos.      •Un examen físico.      Si el dolor dura, pueden hacerle estudios, por ejemplo:  •Resonancia magnética (RM).      •Radiografía.      •Belle exploración por tomografía computarizada (TC).      •Un tipo de exploración por tomografía computarizada (TC) que se usa para examinar el canal raquídeo después de inyectar un tinte en la columna vertebral (mielografía).      •Un estudio para medir cómo los impulsos eléctricos se mueven a través del nervio (estudio de conducción nerviosa).      •Un estudio para medir la actividad eléctrica de los músculos (electromiografía).        ¿Cómo se trata?    En muchos casos, no se requiere tratamiento para esta afección. Con reposo, esta suele mejorar con el tiempo. Si se necesita tratamiento, ceasar puede incluir lo siguiente:  •Trabajar con un fisioterapeuta para mejorar la fuerza y la flexibilidad.      •Pilar analgésicos.      •Aplicar calor o hielo, o ambos, en la jose afectada.      •Someterse a manipulación quiropráctica de la columna vertebral.      •Recibir terapia de estimulación nerviosa eléctrica transcutánea (ENET).      •Recibir belle inyección de corticoesteroides en la colum[place] vertebral.      •Someterse a belle cirugía. [place] puede ser necesario si otros tratamientos no son eficaces. Según la causa de esta afección, podrán implementarse diferentes tipos de cirugía.        Siga estas indicaciones en alfaro casa:    Actividad     •Evite agacharse y realizar cualquier otra actividad que agrave el problema.    •Mantenga belle postura correcta mientras está de pie o sentado.  •Cuando esté de pie, mantenga la parte ifeanyi de la espalda y el tiara rectos, con los hombros hacia atrás. Evite encorvarse.      •Cuando esté sentado, mantenga la espalda recta y relaje los hombros. No curve los hombros ni los eche hacia atrás.        • No permanezca sentado o de pie en el mismo lugar joycelyn mucho tiempo.      •Joycelyn el día, descanse joycelyn lapsos breves. [place] le aliviará el dolor. Por lo general, es mejor descansar acostado o de pie, no sentado.      •Incorpore alguna actividad suave o ejercicios de elongación entre los períodos de descanso. [place] ayudará a evitar la rigidez y el dolor.      •Gilda ejercicio con regularidad. Pregúntele al médico qué actividades son seguras para usted. Si le explicaron cómo realizar ejercicios o ejercicios de elongación, hágalos dora se lo haya indicado el médico.      •Es posible que deba evitar levantar objetos. Pregúntele al médico cuánto peso puede levantar sin correr riesgos.    •Siempre use las técnicas correctas para levantar objetos, entre ellas:  •Flexionar las rodillas.       •Mantener la carga cerca del cuerpo.       •No torcerse.         Control del dolor   •Si se lo indican, aplique hielo sobre la jose afectada. Para hacer esto:  •Ponga el hielo en belle bolsa plástica.       •Coloque belle toalla entre la piel y la bolsa.      •Aplique el hielo joycelyn 20 minutos, 2 o 3 veces por día.      •Retire el hielo si la piel se pone de color mitchell brillante. [place] es muy importante. Si no puede sentir dolor, calor o frío, tiene un mayor riesgo de que se dañe la jose.      •Si se lo indican, aplique calor en la jose afectada con la frecuencia que le haya indicado el médico. Use la karina de calor que el médico le recomiende, dora belle compresa de calor húmedo o belle almohadilla térmica.  •Coloque belle toalla entre la piel y la karina de calor.       •Aplique calor joycelyn 20 a 30 minutos.       •Retire la karina de calor si la piel se pone de color mitchell brillante. [place] es especialmente importante si no puede sentir dolor, calor o frío. Corre un mayor riesgo de sufrir quemaduras.        •Use los medicamentos de venta daphney y los recetados solamente dora se lo haya indicado el médico.      Indicaciones generales     •Duerma sobre un colchón firme en belle posición cómoda. Intente acostarse de costado, con las rodillas ligeramente flexionadas. Si se recuesta sobre la espalda, coloque belle almohada debajo de las rodillas.      •Pregúntele al médico si el medicamento recetado le impide conducir o usar maquinaria.      •Si el médico le recomendó belle dieta o un programa de ejercicios, cúmplalos dora se lo haya indicado.      •Concurra a todas las visitas de seguimiento. [place] es importante.        Comuníquese con un médico si:    •El dolor no mejora con el tiempo, incluso al pilar analgésicos.        Solicite ayuda de inmediato si:    •Siente dolor intenso.      •El dolor empeora repentinamente.      •Aumenta la debilidad en las piernas.      •Pierde la capacidad de controlar la vejiga o el intestino.      •Tiene dificultad para caminar o mantener el equilibrio.      •Tiene fiebre.        Resumen    •La radiculopatía lumbosacra es belle afección que se produce cuando los nervios raquídeos y las raíces nerviosas de la parte baja de la colum[place] vertebral se desplazan o se inflaman y causan síntomas.      •Los síntomas incluyen dolor, adormecimiento y hormigueo que baja por la espalda hasta las piernas (ciática), debilidad muscular y pérdida del control del intestino o de la vejiga.      •Si se lo indican, aplíquese hielo o calor, o ambos, en el área afectada dora se lo haya indicado el médico.      •Siga las instrucciones acerca de realizar actividad física, hacer reposo y las técnicas correctas para levantar objetos.      Esta información no tiene dora fin reemplazar el consejo del médico. Asegúrese de hacerle al médico cualquier pregunta que tenga.      Document Revised: 07/26/2022 Document Reviewed: 07/26/2022

## 2023-02-04 NOTE — ED PROVIDER NOTE - PATIENT PORTAL LINK FT
You can access the FollowMyHealth Patient Portal offered by [facility] by registering at the following website: [email or web address]. By joining [facility]’s FollowMyHealth portal, you will also be able to view your health information using other applications (apps) compatible with our system.

## 2023-02-07 ENCOUNTER — APPOINTMENT (OUTPATIENT)
Dept: NEUROSURGERY | Facility: CLINIC | Age: 38
End: 2023-02-07

## 2023-02-07 NOTE — H&P ADULT - PROBLEM SELECTOR PROBLEM 2
Called pt again to discuss rx refill request. Pt voicemail box is not set up, could not leave a voice message.   
Herniation of intervertebral disc between L5 and S1

## 2024-05-07 NOTE — ED ADULT NURSE NOTE - DRUG PRE-SCREENING (DAST -1)
"Anesthesia Post Evaluation    Patient: Alhaji Mendez Jr.    Procedure(s) Performed: Procedure(s) (LRB):  ESOPHAGOGASTRODUODENOSCOPY (EGD) (N/A)    Final Anesthesia Type: MAC  Patient location during evaluation: PACU  Patient participation: Yes- Able to Participate  Level of consciousness: awake, awake and alert and oriented  Post-procedure vital signs: reviewed and stable  Pain management: adequate  Airway patency: patent  PONV status at discharge: No PONV  Anesthetic complications: no      Cardiovascular status: blood pressure returned to baseline  Respiratory status: unassisted, spontaneous ventilation and room air  Hydration status: euvolemic  Follow-up not needed.        Visit Vitals    BP (!) 149/95 (BP Location: Left arm, Patient Position: Lying, BP Method: Automatic)    Pulse 67    Temp 36.5 °C (97.7 °F) (Oral)    Resp 18    Ht 5' 10.5" (1.791 m)    Wt 88 kg (194 lb)    SpO2 99%    BMI 27.44 kg/m2       Pain/Jono Score: Pain Assessment Performed: Yes (5/2/2017 10:10 AM)  Presence of Pain: denies (5/2/2017 10:10 AM)      "
Statement Selected
NA

## 2024-09-18 NOTE — ED ADULT TRIAGE NOTE - WILL THE PATIENT ACCEPT THE PFIZER COVID-19 VACCINE IF ELIGIBLE AND IT IS AVAILABLE?
No - Ordering, reviewing, and interpreting labs, testing, and imaging.  - Independently obtaining a review of systems and performing a physical exam  - Reviewing consultant documentation/recommendations